# Patient Record
Sex: FEMALE | URBAN - METROPOLITAN AREA
[De-identification: names, ages, dates, MRNs, and addresses within clinical notes are randomized per-mention and may not be internally consistent; named-entity substitution may affect disease eponyms.]

---

## 2019-03-27 ENCOUNTER — APPOINTMENT (RX ONLY)
Dept: URBAN - METROPOLITAN AREA CLINIC 282 | Facility: CLINIC | Age: 34
Setting detail: DERMATOLOGY
End: 2019-03-27

## 2019-03-27 DIAGNOSIS — D22 MELANOCYTIC NEVI: ICD-10-CM

## 2019-03-27 PROBLEM — D22.4 MELANOCYTIC NEVI OF SCALP AND NECK: Status: ACTIVE | Noted: 2019-03-27

## 2019-03-27 PROCEDURE — ? COUNSELING

## 2019-03-27 PROCEDURE — 99202 OFFICE O/P NEW SF 15 MIN: CPT

## 2019-03-27 ASSESSMENT — LOCATION ZONE DERM: LOCATION ZONE: NECK

## 2019-03-27 ASSESSMENT — LOCATION DETAILED DESCRIPTION DERM: LOCATION DETAILED: LEFT INFERIOR POSTERIOR NECK

## 2019-03-27 ASSESSMENT — LOCATION SIMPLE DESCRIPTION DERM: LOCATION SIMPLE: POSTERIOR NECK

## 2019-03-27 NOTE — HPI: SKIN LESIONS
Have Your Skin Lesions Been Treated?: not been treated
Is This A New Presentation, Or A Follow-Up?: Skin Lesion
Additional History: The lesion has been present since birth. It has been slowly growing with her over years.

## 2019-03-27 NOTE — PROCEDURE: COUNSELING
Detail Level: Detailed
Patient Specific Counseling (Will Not Stick From Patient To Patient): I reassured her the lesion appears benign. No treatment is necessary.